# Patient Record
Sex: MALE | Race: WHITE | Employment: OTHER | URBAN - METROPOLITAN AREA
[De-identification: names, ages, dates, MRNs, and addresses within clinical notes are randomized per-mention and may not be internally consistent; named-entity substitution may affect disease eponyms.]

---

## 2024-08-06 NOTE — PROGRESS NOTES
Chief Complaint   Patient presents with    ORN mandible      HPI  Nena Plascencia is a 83 y.o. 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 Cancer returned in 2016 closer to her jaw bone about 4 years later around. She had another resection followed by 30 treatments of radiation - 60Gy.  Diagnosis: cT2 cNo cMx / pT2 pNx xMx, AJCC stage group II skin CA (chin) radiation treatments to the chin with 6x photons using a FiF technique at [facility]. The patient received a total dose of 60 Gy in 30 fractions.     Patient reports she never really knew there was something wrong with her jaw bone until June 2024. However there appears to be notes in the EMR from her dentist to have a lesion of the jaw bone 12/2023 and a CT at that time showed Lytic lesions with irregular margins involving right and left aspect of the mandible surrounding root of right and left 1st and 2nd premolars. Images were requested. I was able to access the CT facial bone from 12/23 and there is evidence of bony erosion at the bilateral anterior mandible with some cortical bone inferiorly bilaterally.  There was concern for malignancy considering her history, but biopsy was negative for malignancy at that time. In July, she was noted to have mucosal breakdown with purulence draining from the left anterior mandible. She was placed on peridex and augmentin by Dr. Calhoun. Patient is seeing ID on Thursday in [place].  She developed swelling under her chin/left submentum and has a residual lump in that area.  She will get occasional drainage from her mouth overnight but is not getting much drainage from her chin.    Social History  She has no history on file for tobacco use, alcohol use, and drug use.  Here with son   Lives alone    [facility]  She has a past medical history of  Basal cell carcinoma (BCC), Elevated cholesterol, and Hypertension.     PSH  She has a past surgical history that includes Tonsillectomy and Cholecystectomy.    ROS  Review of Systems   Constitutional:  Negative for appetite change, chills, fatigue, fever and unexpected weight change.   HENT:  Positive for mouth sores. Negative for dental problem, drooling, ear pain, facial swelling, hearing loss, sore throat, tinnitus, trouble swallowing and voice change.    Respiratory:  Negative for cough, shortness of breath and stridor.    Gastrointestinal:  Negative for nausea and vomiting.   Musculoskeletal:  Negative for neck pain.   Skin:  Positive for wound.   Hematological:  Negative for adenopathy.        PE  ENT Physical Exam  Constitutional  Appearance: patient appears well-developed, thin,  Communication/Voice: communication appropriate for developmental age;  Head and Face  Appearance: head appears normal and face appears normal;  Salivary: glands normal;  Ear  Auricles: right auricle normal; left auricle normal;  Ear Canals: right ear canal normal; left ear canal normal;  Tympanic Membranes: right tympanic membrane normal; left tympanic membrane normal;  Nose  Internal Nose: nasal mucosa normal; septum normal;  Oral Cavity/Oropharynx  Tongue: normal;  Oral mucosa: normal;  OC/OP comments: Left anterolateral gingiva with open wound at the mid-mandible where there is dead bone which was debrided and cultured.  No david purulence.  There is a broken tooth root on the right anterolateral gingiva but no purulence.  There are anterior mandibular teeth with roots exposed but no mandible exposure.  Left posterior molar in place.  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          Oral Mandible Lesion:         Submucosal tissue with remarkable acute and chronic inflammation,  granulation tissue formation, and focal Actinomyces colonies.  Negative for malignancy (see immunohistochemistry).     Seen by Dr. Stanton (Rad Onc Team at [facility]) on 10/17/2016 who recommended RT given positive margins.   RT was completed on 12/28/2016 (6000 cGy in 30/30 fractions directed to the chin).  Diagnosis: cT2 cNo cMx / pT2 pNx xMx, AJCC stage group II skin CA (chin)  radiation treatments to the chin with 6x photons using a FiF technique at [facility]. The patient received a total dose of 60 Gy in 30 fractions.     (Wide excision requiring full-thickness skin graft in Nov 2012 by Dr. Multani with negative margins), who recently underwent Excision of right chin basal cell cancer; Rhomboid flap closure and rotational flap closure by Dr. Calhoun on 10/04/2016:   2015  dr cash  Lesion right lower eyelid biopsy:  Basal   cell carcinoma, the sampled superior, inferior, deep, medial and lateral   margins are negative for carcinoma     2012  dr multani   Lesion, chin-  Invasive basal squamous cell   carcinoma with   infiltration into skeletal muscle fibers, 12, 3, 6 and 9 o'clock   margins of resection are all negative for carcinoma.   Deep margin shows carcinoma to be close but not at the resection   margin.         Current Assessment & Plan     No evidence of disease  H/o resection 2012 & XRT 60Gy to the chin  Appears to be cured of the BCC         Relevant Orders    CT soft tissue neck w IV contrast (Completed)    Renal Function Panel    Osteoradionecrosis of jaw    Current Assessment & Plan      +necrotic bone on the left with ORN of the bilateral mandible on CT face  Need repeat imaging for progression since 12/23  and evaluation of neck vessels  Start Vit E/trental, continue peridex  ID consult this week, cultures obtained, consider IV abx x6 weeks  Discussed need for either salvage surgery if possible versus segmental mandibulectomy and reconstruction  Follow up after repeat imaging  Discussed extent of possible surgery to start to introduce the concepts of surgery         Relevant Medications    pentoxifylline (Trental) 20 mg/mL solution    Other Relevant Orders    Tissue/Wound Culture/Smear    CT soft tissue neck w IV contrast (Completed)    Renal Function Panel        Fani Elkins MD    Head & Neck Surgical Oncology & Reconstruction  Department of Otolaryngology - Head and Neck Surgery     By signing my name below, I, Silverio Purcellibsam, attest that this documentation has been prepared under the direction and in the presence of Dr. Fani Elkins MD.     All medical record entries made by the Scribe were at my direction and personally dictated by me, Dr. Fani Elkins. I have reviewed the chart and agree that the record accurately reflects my personal performance of the history, physical exam, discussion and plan.

## 2024-08-12 ENCOUNTER — APPOINTMENT (OUTPATIENT)
Dept: OTOLARYNGOLOGY | Facility: CLINIC | Age: 84
End: 2024-08-12
Payer: COMMERCIAL

## 2024-08-12 VITALS — HEIGHT: 61 IN | WEIGHT: 125 LBS | BODY MASS INDEX: 23.6 KG/M2

## 2024-08-12 DIAGNOSIS — C44.319 BASAL CELL CARCINOMA (BCC) OF SKIN OF OTHER PART OF FACE: ICD-10-CM
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
## 2024-08-12 DIAGNOSIS — M27.2 OSTEORADIONECROSIS OF JAW: ICD-10-CM

## 2024-08-12 PROCEDURE — 1036F TOBACCO NON-USER: CPT | Performed by: OTOLARYNGOLOGY

## 2024-08-12 PROCEDURE — 1159F MED LIST DOCD IN RCRD: CPT | Performed by: OTOLARYNGOLOGY

## 2024-08-12 PROCEDURE — 1160F RVW MEDS BY RX/DR IN RCRD: CPT | Performed by: OTOLARYNGOLOGY

## 2024-08-12 PROCEDURE — 99204 OFFICE O/P NEW MOD 45 MIN: CPT | Performed by: OTOLARYNGOLOGY

## 2024-08-12 RX ORDER — ATORVASTATIN CALCIUM 10 MG/1
1 TABLET, FILM COATED ORAL DAILY
COMMUNITY
Start: 2023-12-27

## 2024-08-12 RX ORDER — LISINOPRIL 10 MG/1
1 TABLET ORAL DAILY
COMMUNITY
Start: 2024-03-18

## 2024-08-12 RX ORDER — OMEGA-3S/DHA/EPA/FISH OIL/D3 300MG-1000
1 CAPSULE ORAL DAILY
COMMUNITY

## 2024-08-12 RX ORDER — ACETAMINOPHEN 500 MG
2000 TABLET ORAL DAILY
COMMUNITY

## 2024-08-12 RX ORDER — LANOLIN ALCOHOL/MO/W.PET/CERES
1 CREAM (GRAM) TOPICAL DAILY
COMMUNITY

## 2024-08-12 RX ORDER — LEVOTHYROXINE SODIUM 50 UG/1
1 TABLET ORAL DAILY
COMMUNITY
Start: 2024-03-20

## 2024-08-12 RX ORDER — SODIUM FLUORIDE1.1%, POTASSIUM NITRATE 5% 5.8; 57.5 MG/ML; MG/ML
100 GEL, DENTIFRICE DENTAL DAILY
COMMUNITY
Start: 2024-04-28

## 2024-08-12 ASSESSMENT — ENCOUNTER SYMPTOMS
COUGH: 0
SHORTNESS OF BREATH: 0
ADENOPATHY: 0
NAUSEA: 0
WOUND: 1
CHILLS: 0
FACIAL SWELLING: 0
NECK PAIN: 0
VOMITING: 0
TROUBLE SWALLOWING: 0
FEVER: 0
UNEXPECTED WEIGHT CHANGE: 0
SORE THROAT: 0
APPETITE CHANGE: 0
STRIDOR: 0
VOICE CHANGE: 0
FATIGUE: 0

## 2024-08-12 NOTE — ASSESSMENT & PLAN NOTE
+necrotic bone on the left with ORN of the bilateral mandible on CT face  Need repeat imaging for progression since 12/23  and evaluation of neck vessels  Start Vit E/trental, continue peridex  ID consult this week, cultures obtained, consider IV abx x6 weeks  Discussed need for either salvage surgery if possible versus segmental mandibulectomy and reconstruction  Follow up after repeat imaging  Discussed extent of possible surgery to start to introduce the concepts of surgery

## 2024-08-12 NOTE — PATIENT INSTRUCTIONS
Dear Nena Plascencia    Welcome to Dr. [facility],    Dr. Elkins is a Head and neck oncologist and reconstructive surgeon. This means that she specializes in caring for patients with complex head and neck problems such as cancer, however,you may be seeing her for another reason.      Dr. Elkins's office number is [phone] option #2.  While you may see her at a satellite office, she has a team committed to help meet your healthcare needs at [facility].  This number listed, is the most direct way to communicate with her office.      Dr. Elkins's  answers the office phone from 8am-4pm Monday-Friday.  She can help you with many general questions and information.  Questions that she may not be able to answer will be directed to the appropriate staff.  You will need to leave a message and someone from the team will call you back.     Lydia and Guera are Dr. Elkins's primary nurses. They can both be reached by calling the office as well. Lydia is in clinic on Mondays and Wednesdays with Dr. Elkins. Lydia is out of the office on Tuesday's and Friday's, but Guera is in the office on Tuesdays and Fridays and will be covering all patient concerns. Please be mindful that all non urgent calls will be returned within 24 hours of the call.     Sometimes, other team members will also be involved in your care.  These people may include dieticians, social workers, speech therapists, medical oncologists, or radiation oncologists.  Dr. Elkins will provide these referrals for you as needed.      For your connivence, Dr. Elkins sees patients at several [facility] locations. These locations are [facility], [facility], and [facility] at the [facility].  While we try to make your appointment as convenient as possible, occasionally a visit to another location may be necessary to provide you with the best care.      We look  forward to working with you to meet your healthcare goals.      Dr. Elkins evaluated you today.    Your care plan is outlined below:  -- CT neck w/ contrast recommended  -- you need lab work today or in the next few days. Please have these done at a  lab of your choice. This is just a walk in appointment.   -- Dr. Elkins ordered a medication for you today. Please pick this up at your pharmacy and start taking as soon as possible. Please follow all medication instructions provided to you by Dr. Elkins. Please call us or message us on [employer] with any questions or concerns.     General appointment line please call [phone]  For general questions or scheduling issues please call [phone] option #2   For medical questions or surgery scheduling please call [phone] on Mondays, Wednesdays and Thursdays or [phone] on Tuesdays and Fridays. Please be sure to leave a voice mail or your call will not be able to be returned.     Dr. Elkins makes every effort to run on time for your appointments.  Therefore, if you are more than 30 minutes late for your appointment, unrelated to a scan or another appointment such as chemotherapy or radiation, your appointment will need to be rescheduled to another day.  We appreciate your understanding.

## 2024-08-15 LAB
BACTERIA SPEC CULT: ABNORMAL
BACTERIA SPEC CULT: ABNORMAL
GRAM STN SPEC: ABNORMAL
GRAM STN SPEC: ABNORMAL